# Patient Record
Sex: FEMALE | Race: WHITE | NOT HISPANIC OR LATINO | Employment: OTHER
[De-identification: names, ages, dates, MRNs, and addresses within clinical notes are randomized per-mention and may not be internally consistent; named-entity substitution may affect disease eponyms.]

---

## 2022-05-18 ENCOUNTER — TRANSCRIBE ORDERS (OUTPATIENT)
Dept: OTHER | Age: 67
End: 2022-05-18

## 2022-05-18 DIAGNOSIS — M51.16 LUMBAR DISC DISEASE WITH RADICULOPATHY: ICD-10-CM

## 2022-05-18 DIAGNOSIS — M25.551 RIGHT HIP PAIN: Primary | ICD-10-CM

## 2022-06-06 ENCOUNTER — THERAPY VISIT (OUTPATIENT)
Dept: PHYSICAL THERAPY | Facility: CLINIC | Age: 67
End: 2022-06-06
Payer: COMMERCIAL

## 2022-06-06 DIAGNOSIS — M51.16 LUMBAR DISC DISEASE WITH RADICULOPATHY: ICD-10-CM

## 2022-06-06 DIAGNOSIS — G89.29 CHRONIC BILATERAL LOW BACK PAIN WITH RIGHT-SIDED SCIATICA: ICD-10-CM

## 2022-06-06 DIAGNOSIS — M25.551 RIGHT HIP PAIN: ICD-10-CM

## 2022-06-06 DIAGNOSIS — M54.41 CHRONIC BILATERAL LOW BACK PAIN WITH RIGHT-SIDED SCIATICA: ICD-10-CM

## 2022-06-06 DIAGNOSIS — M25.551 HIP PAIN, RIGHT: ICD-10-CM

## 2022-06-06 PROCEDURE — 97161 PT EVAL LOW COMPLEX 20 MIN: CPT | Mod: GP | Performed by: PHYSICAL THERAPIST

## 2022-06-06 PROCEDURE — 97110 THERAPEUTIC EXERCISES: CPT | Mod: GP | Performed by: PHYSICAL THERAPIST

## 2022-06-06 NOTE — PROGRESS NOTES
Physical Therapy Initial Evaluation  Subjective:  The history is provided by the patient. No  was used.   Therapist Generated HPI Evaluation  Problem details: Keturah saw Dr. Pino on 5/18/22 with c/o R hip and low back pain.  Keturha reports that her pain started over a year ago and has worsened since that time. She was referred to PT for evaluation and treatment.  Keturah reports that her R hip hurts the most when she lays down at night (on either side) and when she gets up from sitting in a soft chair- finds it hard to sit on her couch for now.  Pt states she tosses and turns all night trying to get comfortable, wants to get a good night's sleep and allow her  to as well.  .         Type of problem:  Lumbar.    This is a chronic condition.  Condition occurred with:  Insidious onset.  Where condition occurred: for unknown reasons.  Patient reports pain:  Lower lumbar spine.  Pain is described as aching (deep ache, hard pain in calf (getting better over past couple of weeks)) and is intermittent.  Pain radiates to:  Gluteals right, gluteals left, thigh right, knee right and lower leg right (R>>L). Pain is worse during the night.  Since onset symptoms are gradually worsening.  Associated symptoms:  Loss of motion/stiffness. Symptoms are exacerbated by lying down, sitting and certain positions (sitting in a soft chair, going up stairs, crossing R ankle on L knee (figure 4))  and relieved by NSAID's (tylenol PM).  Special tests included:  X-ray (hip is unremarkable).    Barriers include:  None as reported by patient.    Patient Health History  Keturah Arriaga being seen for hip and R leg pain.     Date of Onset: 1-2 years ago.   Problem occurred: unknown   Pain is reported as 2/10 on pain scale.  General health as reported by patient is excellent.  Pertinent medical history includes: none.   Red flags:  Pain at rest/night.  Medical allergies: none.   Surgeries include:  Other. Other surgery  history details: appendix, L breast cyst.    Current medications:  Pain medication and sleep medication.    Current occupation is retired.   Primary job tasks include:  Lifting/carrying and driving.   Other job/home tasks details: laundry cooking, grocery shopping.                                  Objective:    Gait:    Gait Type:  Antalgic   Assistive Devices:  None  Deviations:  Lumbar:  Trunk lean LHip:  Hip hiking L          Physical Exam      David Lumbar Evaluation    Posture:  Sitting: good  Standing: good  Lordosis: Reduced  Lateral Shift: yes and left  Correction of Posture: no effect    Movement Loss:  Flexion (Flex): nil  Extension (EXT): min  Side Glide R (SG R): min and pain  Side Glide L (SG L): nil  Test Movements:    EIS: During: no effect  After: no effect  Mechanical Response: no effect  Repeat EIS: During: no effect  After: worse and peripheralizing  Mechanical Response: IncROM    EIL: During: no effect  After: no effect  Mechanical Response: no effect  Repeat EIL: During: no effect  After: peripheralizing and worse  Mechanical Response: IncROM  SGIS R: During: abolishes  After: no better  Mechanical Response: no effect  Repeat SGIS R: During: abolishes  After: no better  Mechanical Response: no effect        Principle of Treatment:  Posture Correction: discussed correction with lumbar roll as is comfortable, likely doesnt require one while in a firm chair as pt already demonstrates good sitting posture    Extension: LEONEL x10, every 2-3 hours or as needed for relief.  Pt instructed to discontinue if she feels her symptoms peripheralize and remain worse.      Other: Discussion and pt education regarding spine vs hip differential dx, directional preference exercises, answered pt questions                                   Musculoskeletal:        Legs:    Hip evaluation truncated d/t late pt arrival (went to wrong clinic). Will further assess R hip as needed at subsequent visits.       ROS    Assessment/Plan:    Patient is a 67 year old female with lumbar and right side hip complaints.    Patient has the following significant findings with corresponding treatment plan.                Diagnosis 1:  R LBP with radiculopathy, R hip pain  Pain -  hot/cold therapy, manual therapy, self management, education, directional preference exercise and home program  Decreased ROM/flexibility - manual therapy, therapeutic exercise and home program  Decreased joint mobility - manual therapy, therapeutic exercise and home program  Impaired gait - gait training and home program  Impaired muscle performance - neuro re-education and home program  Decreased function - therapeutic activities and home program  Impaired posture - neuro re-education and home program    Therapy Evaluation Codes:   1) History comprised of:   Personal factors that impact the plan of care:      None.    Comorbidity factors that impact the plan of care are:      None.     Medications impacting care: None.  2) Examination of Body Systems comprised of:   Body structures and functions that impact the plan of care:      Hip and Lumbar spine.   Activity limitations that impact the plan of care are:      Dressing, Sitting, Stairs, Walking and Sleeping.  3) Clinical presentation characteristics are:   Stable/Uncomplicated.  4) Decision-Making    Low complexity using standardized patient assessment instrument and/or measureable assessment of functional outcome.  Cumulative Therapy Evaluation is: Low complexity.    Previous and current functional limitations:  (See Goal Flow Sheet for this information)    Short term and Long term goals: (See Goal Flow Sheet for this information)     Communication ability:  Patient appears to be able to clearly communicate and understand verbal and written communication and follow directions correctly.  Treatment Explanation - The following has been discussed with the patient:   RX ordered/plan of care  Anticipated  outcomes  Possible risks and side effects  This patient would benefit from PT intervention to resume normal activities.   Rehab potential is good.    Frequency:  1 X week, once daily  Duration:  for 8 weeks  Discharge Plan:  Achieve all LTG.  Independent in home treatment program.  Reach maximal therapeutic benefit.    Please refer to the daily flowsheet for treatment today, total treatment time and time spent performing 1:1 timed codes.

## 2022-06-06 NOTE — PROGRESS NOTES
[facility]    OUTPATIENT Physical Therapy ORTHOPEDIC EVALUATION  PLAN OF TREATMENT FOR OUTPATIENT REHABILITATION  (COMPLETE FOR INITIAL CLAIMS ONLY)  Patient's Last Name, First Name, M.I.  YOB: 1955  Keturah Arriaga    Provider s Name:  [facility]   Medical Record No.  [id number]   Start of Care Date:  06/06/22   Onset Date:   05/18/22 (MD order)   Type:     _X__PT   ___OT Medical Diagnosis:    Encounter Diagnoses   Name Primary?    Right hip pain     Lumbar disc disease with radiculopathy     Chronic bilateral low back pain with right-sided sciatica     Hip pain, right         Treatment Diagnosis:  B LBP with R radiculopathy, R hip pain        Goals:     06/06/22 0500   Body Part   Goals listed below are for low back/ R hip   Goal #1   Goal #1 sleeping   Previous Functional Level No restrictions   Current Functional Level 2-3 hours without sleep per night   Performance Level d/t R hip and leg pain   STG Target Performance Less than 1 hour without sleep per night   Performance Level 6/7 nights   Rationale to establish restorative sleep pattern   Due Date 07/04/22   LTG Target Performance Sleep through the night w/o meds   Performance level 6/7 nights   Rationale to establish restorative sleep pattern   Due Date 08/01/22       Therapy Frequency:  1x/week  Predicted Duration of Therapy Intervention:  8 weeks    Elaine Lerma, PT                 I CERTIFY THE NEED FOR THESE SERVICES FURNISHED UNDER        THIS PLAN OF TREATMENT AND WHILE UNDER MY CARE     (Physician attestation of this document indicates review and certification of the therapy plan).                     Certification Date From:  06/06/22   Certification Date To:  08/01/22    Referring Provider:  Antonio Pino    Initial Assessment        See Epic Evaluation SOC Date: 06/06/22

## 2022-06-13 ENCOUNTER — THERAPY VISIT (OUTPATIENT)
Dept: PHYSICAL THERAPY | Facility: CLINIC | Age: 67
End: 2022-06-13
Payer: COMMERCIAL

## 2022-06-13 DIAGNOSIS — M25.551 HIP PAIN, RIGHT: ICD-10-CM

## 2022-06-13 DIAGNOSIS — G89.29 CHRONIC BILATERAL LOW BACK PAIN WITH RIGHT-SIDED SCIATICA: Primary | ICD-10-CM

## 2022-06-13 DIAGNOSIS — M54.41 CHRONIC BILATERAL LOW BACK PAIN WITH RIGHT-SIDED SCIATICA: Primary | ICD-10-CM

## 2022-06-13 PROCEDURE — 97110 THERAPEUTIC EXERCISES: CPT | Mod: GP | Performed by: PHYSICAL THERAPIST

## 2022-06-13 PROCEDURE — 97530 THERAPEUTIC ACTIVITIES: CPT | Mod: GP | Performed by: PHYSICAL THERAPIST

## 2022-06-20 ENCOUNTER — THERAPY VISIT (OUTPATIENT)
Dept: PHYSICAL THERAPY | Facility: CLINIC | Age: 67
End: 2022-06-20
Payer: COMMERCIAL

## 2022-06-20 DIAGNOSIS — M25.551 HIP PAIN, RIGHT: ICD-10-CM

## 2022-06-20 DIAGNOSIS — M54.41 CHRONIC BILATERAL LOW BACK PAIN WITH RIGHT-SIDED SCIATICA: Primary | ICD-10-CM

## 2022-06-20 DIAGNOSIS — G89.29 CHRONIC BILATERAL LOW BACK PAIN WITH RIGHT-SIDED SCIATICA: Primary | ICD-10-CM

## 2022-06-20 PROCEDURE — 97110 THERAPEUTIC EXERCISES: CPT | Mod: GP | Performed by: PHYSICAL THERAPIST

## 2022-07-24 ENCOUNTER — HEALTH MAINTENANCE LETTER (OUTPATIENT)
Age: 67
End: 2022-07-24

## 2022-07-26 ENCOUNTER — THERAPY VISIT (OUTPATIENT)
Dept: PHYSICAL THERAPY | Facility: CLINIC | Age: 67
End: 2022-07-26
Payer: COMMERCIAL

## 2022-07-26 DIAGNOSIS — M54.41 CHRONIC BILATERAL LOW BACK PAIN WITH RIGHT-SIDED SCIATICA: Primary | ICD-10-CM

## 2022-07-26 DIAGNOSIS — G89.29 CHRONIC BILATERAL LOW BACK PAIN WITH RIGHT-SIDED SCIATICA: Primary | ICD-10-CM

## 2022-07-26 DIAGNOSIS — M25.551 HIP PAIN, RIGHT: ICD-10-CM

## 2022-07-26 PROCEDURE — 97110 THERAPEUTIC EXERCISES: CPT | Mod: GP | Performed by: PHYSICAL THERAPIST

## 2022-07-26 PROCEDURE — 97530 THERAPEUTIC ACTIVITIES: CPT | Mod: GP | Performed by: PHYSICAL THERAPIST

## 2022-08-02 ENCOUNTER — THERAPY VISIT (OUTPATIENT)
Dept: PHYSICAL THERAPY | Facility: CLINIC | Age: 67
End: 2022-08-02
Payer: COMMERCIAL

## 2022-08-02 DIAGNOSIS — G89.29 CHRONIC BILATERAL LOW BACK PAIN WITH RIGHT-SIDED SCIATICA: Primary | ICD-10-CM

## 2022-08-02 DIAGNOSIS — M25.551 HIP PAIN, RIGHT: ICD-10-CM

## 2022-08-02 DIAGNOSIS — M54.41 CHRONIC BILATERAL LOW BACK PAIN WITH RIGHT-SIDED SCIATICA: Primary | ICD-10-CM

## 2022-08-02 PROCEDURE — 97110 THERAPEUTIC EXERCISES: CPT | Mod: GP | Performed by: PHYSICAL THERAPIST

## 2022-08-02 NOTE — PROGRESS NOTES
"PROGRESS  REPORT    Progress reporting period is from 6/6/2022 to 8/2/2022.       SUBJECTIVE  Patient notes that she was very consistent with her home program over this past week.  Still waking 3-4 times a night this past week due to her R lateral LE symptoms.  Not sure why she is waking more since her activity level did not change.  She has felt some of the symptoms down into the lower leg at night as well.  She is constantly changing positions at night to get comfortable.  Home exercise program does not appear to allowing any additional sleep.  Still better than when she started PT but not as good as she was prior to going on vacation.  Current Pain level: 1/10 (lateral R hip).     Initial Pain level: 2/10.   Changes in function:  Yes (See Goal flowsheet attached for changes in current functional level)  Adverse reaction to treatment or activity: None - does question if water aerobics may be bothering her hip/LE as she can feel symptoms with some of the exercises she does with her hip for the R LE.  She is not worse after water aerobics.      OBJECTIVE  Changes noted in objective findings:  Yes, minimal changes - now assessing lumbar extension biased exercise.  Objective:   LROM  EIS nil/min loss with central low back pain-did not feel lateral hip during, FIS finger tips 8\" above the floor with stretch posterior knees, symmetrical SGIS no effect on symptoms  She does gain range of motion after extension biased exercise and her symptoms do move from the R lateral hip to the lower back.   Pain was increased with flexion biased exercise/positioning  VEGA score has not changed - remains at 15.56 and Merlin STarT Back score remains low  At evaluation her lumbar ROM was Flexion (Flex): nil Extension (EXT): min, Side Glide R (SG R): min and pain, Side Glide L (SG L): nil loss    ASSESSMENT/PLAN  Updated problem list and treatment plan: Diagnosis 1:  Low back pain, R hip pain    Pain -  manual therapy, self management, " education, directional preference exercise, home program and modalities if needed  Decreased ROM/flexibility - manual therapy, therapeutic exercise and home program  Decreased joint mobility - manual therapy, therapeutic exercise and home program  Decreased function - therapeutic activities and home program  Impaired posture - neuro re-education, therapeutic activities and home program  STG/LTGs have been met or progress has been made towards goals:  Yes (See Goal flow sheet completed today. Had made good progress prior to vacation, now sleeping remains better but more disturbed as compared to mid/end of June 2022)  Assessment of Progress: The patient's condition has potential to improve.  Self Management Plans:  Patient has been instructed in a home treatment program.  Patient  has been instructed in self management of symptoms.  I have re-evaluated this patient and find that the nature, scope, duration and intensity of the therapy is appropriate for the medical condition of the patient.  Keturah continues to require the following intervention to meet STG and LTG's:  PT    Recommendations:  This patient would benefit from continued therapy.     Frequency:  1 X week, once daily  Duration:  for 6 weeks        Please refer to the daily flowsheet for treatment today, total treatment time and time spent performing 1:1 timed codes.

## 2022-08-02 NOTE — PROGRESS NOTES
[facility]    OUTPATIENT Physical Therapy ORTHOPEDIC EVALUATION  PLAN OF TREATMENT FOR OUTPATIENT REHABILITATION  (COMPLETE FOR INITIAL CLAIMS ONLY)  Patient's Last Name, First Name, M.I.  YOB: 1955  Keturah Arriaga    Provider s Name:  [facility]   Medical Record No.  [id number]   Start of Care Date:  06/06/22   Onset Date:   05/18/22 (MD order)   Type:     _X__PT   ___OT Medical Diagnosis:    Encounter Diagnoses   Name Primary?    Chronic bilateral low back pain with right-sided sciatica Yes    Hip pain, right         Treatment Diagnosis:  B LBP with R radiculopathy, R hip pain        Goals:     08/02/22 0500   Body Part   Goals listed below are for low back/ R hip   Goal #1   Goal #1 sleeping   Previous Functional Level No restrictions   Current Functional Level 2-3 hours without sleep per night   Performance Level still having issues sleeping with production of symtoms down the lateral R thigh to lateral calf.  (assessing extension)   STG Target Performance Less than 1 hour without sleep per night   Performance Level 6/7 nights   Rationale to establish restorative sleep pattern   Due Date 08/08/22   If goal not met, why? not a good sleeper and will take intermittent sleeping medication but back still does wake her   LTG Target Performance Sleep through the night w/o meds   Performance level 6/7 nights   Rationale to establish restorative sleep pattern   Due Date 08/29/22         Therapy Frequency:  1x/week  Predicted Duration of Therapy Intervention:  8 weeks    Sultana Alvarado, PT                 I CERTIFY THE NEED FOR THESE SERVICES FURNISHED UNDER        THIS PLAN OF TREATMENT AND WHILE UNDER MY CARE .             Physician Signature               Date    X_____________________________________________________                         Certification Date  From:  08/01/22   Certification Date To:  09/30/22    Referring Provider:  Antonio Pino    Initial Assessment        See Epic Evaluation SOC Date: 06/06/22

## 2022-08-30 ENCOUNTER — THERAPY VISIT (OUTPATIENT)
Dept: PHYSICAL THERAPY | Facility: CLINIC | Age: 67
End: 2022-08-30
Payer: COMMERCIAL

## 2022-08-30 DIAGNOSIS — M25.551 HIP PAIN, RIGHT: ICD-10-CM

## 2022-08-30 DIAGNOSIS — M54.41 CHRONIC BILATERAL LOW BACK PAIN WITH RIGHT-SIDED SCIATICA: Primary | ICD-10-CM

## 2022-08-30 DIAGNOSIS — G89.29 CHRONIC BILATERAL LOW BACK PAIN WITH RIGHT-SIDED SCIATICA: Primary | ICD-10-CM

## 2022-08-30 PROCEDURE — 97110 THERAPEUTIC EXERCISES: CPT | Mod: GP | Performed by: PHYSICAL THERAPIST

## 2022-09-08 ENCOUNTER — THERAPY VISIT (OUTPATIENT)
Dept: PHYSICAL THERAPY | Facility: CLINIC | Age: 67
End: 2022-09-08
Payer: COMMERCIAL

## 2022-09-08 DIAGNOSIS — M54.41 CHRONIC BILATERAL LOW BACK PAIN WITH RIGHT-SIDED SCIATICA: Primary | ICD-10-CM

## 2022-09-08 DIAGNOSIS — G89.29 CHRONIC BILATERAL LOW BACK PAIN WITH RIGHT-SIDED SCIATICA: Primary | ICD-10-CM

## 2022-09-08 DIAGNOSIS — M25.551 HIP PAIN, RIGHT: ICD-10-CM

## 2022-09-08 PROCEDURE — 97110 THERAPEUTIC EXERCISES: CPT | Mod: GP | Performed by: PHYSICAL THERAPIST

## 2022-09-15 ENCOUNTER — THERAPY VISIT (OUTPATIENT)
Dept: PHYSICAL THERAPY | Facility: CLINIC | Age: 67
End: 2022-09-15
Payer: COMMERCIAL

## 2022-09-15 DIAGNOSIS — M25.551 HIP PAIN, RIGHT: ICD-10-CM

## 2022-09-15 DIAGNOSIS — M54.41 CHRONIC BILATERAL LOW BACK PAIN WITH RIGHT-SIDED SCIATICA: Primary | ICD-10-CM

## 2022-09-15 DIAGNOSIS — G89.29 CHRONIC BILATERAL LOW BACK PAIN WITH RIGHT-SIDED SCIATICA: Primary | ICD-10-CM

## 2022-09-15 PROCEDURE — 97110 THERAPEUTIC EXERCISES: CPT | Mod: GP | Performed by: PHYSICAL THERAPIST

## 2022-09-22 ENCOUNTER — THERAPY VISIT (OUTPATIENT)
Dept: PHYSICAL THERAPY | Facility: CLINIC | Age: 67
End: 2022-09-22
Payer: COMMERCIAL

## 2022-09-22 DIAGNOSIS — M25.551 HIP PAIN, RIGHT: ICD-10-CM

## 2022-09-22 DIAGNOSIS — G89.29 CHRONIC BILATERAL LOW BACK PAIN WITH RIGHT-SIDED SCIATICA: Primary | ICD-10-CM

## 2022-09-22 DIAGNOSIS — M54.41 CHRONIC BILATERAL LOW BACK PAIN WITH RIGHT-SIDED SCIATICA: Primary | ICD-10-CM

## 2022-09-22 PROCEDURE — 97110 THERAPEUTIC EXERCISES: CPT | Mod: GP | Performed by: PHYSICAL THERAPIST

## 2022-09-29 ENCOUNTER — THERAPY VISIT (OUTPATIENT)
Dept: PHYSICAL THERAPY | Facility: CLINIC | Age: 67
End: 2022-09-29
Payer: COMMERCIAL

## 2022-09-29 DIAGNOSIS — M54.41 CHRONIC BILATERAL LOW BACK PAIN WITH RIGHT-SIDED SCIATICA: Primary | ICD-10-CM

## 2022-09-29 DIAGNOSIS — G89.29 CHRONIC BILATERAL LOW BACK PAIN WITH RIGHT-SIDED SCIATICA: Primary | ICD-10-CM

## 2022-09-29 DIAGNOSIS — M25.551 HIP PAIN, RIGHT: ICD-10-CM

## 2022-09-29 PROCEDURE — 97110 THERAPEUTIC EXERCISES: CPT | Mod: GP | Performed by: PHYSICAL THERAPIST

## 2022-09-30 NOTE — PROGRESS NOTES
"PROGRESS  REPORT    Progress reporting period is from 8/2/2022 to 9/29/2022.       SUBJECTIVE  Patient continues to feel intermittent R sided back pain with intermittent symptoms into her lateral thigh, lateral calf or medial calf.  She has been increasing her activity level (water aerobics, weight lifting, walking) and working on cooking from scratch.  Sleeping better with increased activity, ibuprofen 2-3 times a day and continued range of motion exercises for the lumbar spine (side glides in standing with her sore side away from the wall and following with extension in standing or prone lying.  She resumed water aerobics on 9/26/2022.  She can feel an ache lateral calf symptom when she awoke at night and the following morning.  The symptoms were mild.  Able to fall back to sleep with ease.   Current Pain level: 0/10.     Previous pain level was  1/10   Initial Pain level: 2/10.   Changes in function:  Yes (See Goal flowsheet attached for changes in current functional level)  Adverse reaction to treatment or activity: None    OBJECTIVE  Changes noted in objective findings:  Yes, improved LROM, improved standing posture  Objective:   LROM FIS nil loss, EIS produce R posterior hip/LB with symptoms moving up the R side, mod loss R SGIS with prod R posterior hip/LB and up the spine (same as ext), full L SGIS with pull lateral R hip   8/2/22 - EIS nil/min loss with central low back pain-did not feel lateral hip during, FIS finger tips 8\" above the floor with stretch posterior knees, symmetrical SGIS no effect on symptoms  Standing more erect; at last PN she was standing with more of a L lateral shift.  Patient does tend to limp on her R LE but is not aware of the limping as is not painful.    She does have intermittent symptoms with R Side Glides In Standing and/or lumbar extension in standing    ASSESSMENT/PLAN  Updated problem list and treatment plan: Diagnosis 1:  Low back pain, R hip pain    Pain -  manual therapy, " self management, education, directional preference exercise, home program and modalities  Decreased ROM/flexibility - manual therapy, therapeutic exercise and home program  Impaired gait - home program, therapeutic activity and therapeutic activity  Impaired muscle performance - neuro re-education and home program  Decreased function - neuro re-education, therapeutic activity and therapeutic exercise  STG/LTGs have been met or progress has been made towards goals:  Yes (See Goal flow sheet completed today.)  Assessment of Progress: The patient's condition is improving.  Self Management Plans:  Patient has been instructed in a home treatment program.  Patient  has been instructed in self management of symptoms.  I have re-evaluated this patient and find that the nature, scope, duration and intensity of the therapy is appropriate for the medical condition of the patient.  Keturah continues to require the following intervention to meet STG and LTG's:  PT    Recommendations:  This patient would benefit from continued therapy.     Frequency:  Every other week, once daily  Duration:  for 8 weeks        Please refer to the daily flowsheet for treatment today, total treatment time and time spent performing 1:1 timed codes.

## 2022-10-03 ENCOUNTER — HEALTH MAINTENANCE LETTER (OUTPATIENT)
Age: 67
End: 2022-10-03

## 2022-11-29 PROBLEM — G89.29 CHRONIC BILATERAL LOW BACK PAIN WITH RIGHT-SIDED SCIATICA: Status: RESOLVED | Noted: 2022-06-06 | Resolved: 2022-11-29

## 2022-11-29 PROBLEM — M25.551 HIP PAIN, RIGHT: Status: RESOLVED | Noted: 2022-06-06 | Resolved: 2022-11-29

## 2022-11-29 PROBLEM — M54.41 CHRONIC BILATERAL LOW BACK PAIN WITH RIGHT-SIDED SCIATICA: Status: RESOLVED | Noted: 2022-06-06 | Resolved: 2022-11-29

## 2022-11-29 NOTE — PROGRESS NOTES
Patient did not return for further treatment.  Please refer to the progress note and goal flowsheet completed on 09/29/22 for discharge information.  Patient did call to cancel last scheduled appointment as she noted working out at [employer] was going well, without any increase in symptoms.  Sleep is better and not waking due to back or hip pain.  Does feel she is independent with her home program and will continue with her current program through [employer].

## 2023-08-13 ENCOUNTER — HEALTH MAINTENANCE LETTER (OUTPATIENT)
Age: 68
End: 2023-08-13

## 2024-07-28 ENCOUNTER — HEALTH MAINTENANCE LETTER (OUTPATIENT)
Age: 69
End: 2024-07-28

## 2024-10-06 ENCOUNTER — HEALTH MAINTENANCE LETTER (OUTPATIENT)
Age: 69
End: 2024-10-06

## 2025-07-22 ENCOUNTER — TRANSCRIBE ORDERS (OUTPATIENT)
Dept: OTHER | Age: 70
End: 2025-07-22

## 2025-07-22 DIAGNOSIS — M54.31 RIGHT SIDED SCIATICA: Primary | ICD-10-CM
